# Patient Record
Sex: FEMALE | Race: WHITE
[De-identification: names, ages, dates, MRNs, and addresses within clinical notes are randomized per-mention and may not be internally consistent; named-entity substitution may affect disease eponyms.]

---

## 2018-10-08 ENCOUNTER — HOSPITAL ENCOUNTER (OUTPATIENT)
Dept: HOSPITAL 62 - OD | Age: 58
End: 2018-10-08
Attending: NURSE PRACTITIONER
Payer: MEDICAID

## 2018-10-08 DIAGNOSIS — M79.672: Primary | ICD-10-CM
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
## 2018-10-08 DIAGNOSIS — S92.152D: ICD-10-CM

## 2018-10-08 NOTE — RADIOLOGY REPORT (SQ)
EXAM DESCRIPTION:  FOOT LEFT COMPLETE



COMPLETED DATE/TIME:  10/8/2018 12:35 pm



REASON FOR STUDY:  LEFT FOOT PAIN twisted left foot on stairs 2 weeks ago, continued pain



COMPARISON:  None.



NUMBER OF VIEWS:  Three views.



TECHNIQUE:  AP, lateral and oblique  radiographic images acquired of the left foot.



LIMITATIONS:  None.



FINDINGS:  MINERALIZATION: Normal.

BONES: Patient is post surgery at the 1st MTP joint, with a left 1st metatarsal head prosthesis.  Bon
y remodeling of the base of the great toe proximal phalanx.  No lucency around the hardware worrisome
 for loosening.

Tiny subacute avulsion fracture along the dorsal aspect of the talus best shown on lateral view

JOINTS: No effusions.

SOFT TISSUES: Anterior left ankle soft tissue swelling, in the area of the distal tibialis anterior t
endon.  No foreign body.

OTHER: No other significant finding.



IMPRESSION:  Tiny subacute avulsion fracture along the dorsal aspect of the talus, best shown on late
ral view.  There is adjacent anterior left ankle soft tissue swelling.

Left 1st metatarsal head prosthesis with bony remodeling at the base great toe proximal phalanx.  No 
lucency around the hardware worrisome for loosening



TECHNICAL DOCUMENTATION:  JOB ID:  [id number]

 2011 [employer]- All Rights Reserved



Reading location - IP/workstation name: [email or web address]

## 2018-10-21 ENCOUNTER — HOSPITAL ENCOUNTER (EMERGENCY)
Dept: HOSPITAL 62 - ER | Age: 58
Discharge: HOME | End: 2018-10-21
Payer: MEDICAID

## 2018-10-21 VITALS — SYSTOLIC BLOOD PRESSURE: 117 MMHG | DIASTOLIC BLOOD PRESSURE: 65 MMHG

## 2018-10-21 DIAGNOSIS — E11.9: ICD-10-CM
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
## 2018-10-21 DIAGNOSIS — I10: ICD-10-CM

## 2018-10-21 DIAGNOSIS — S61.512A: Primary | ICD-10-CM

## 2018-10-21 PROCEDURE — 99282 EMERGENCY DEPT VISIT SF MDM: CPT

## 2018-10-21 PROCEDURE — 0HQEXZZ REPAIR LEFT LOWER ARM SKIN, EXTERNAL APPROACH: ICD-10-PCS

## 2018-10-21 NOTE — ER DOCUMENT REPORT
ED Wound





- General


Chief Complaint: Laceration


Stated Complaint: LACERATION TO LEFT WRIST


Time Seen by Provider: 10/21/18 15:09


Mode of Arrival: Ambulatory


Information source: Patient


Notes: 





Patient is a 58-year-old female comes emergency room complaining of a 

laceration to her left wrist.  Patient states she was washing a large glass it 

slipped in her hands she went to grab it and thought she was above the sink to 

pull it toward her body when she was actually at the middle of the sink 

shattering the glass and cutting her left wrist.  The laceration is on the 

little finger side of the left wrist.


TRAVEL OUTSIDE OF THE [place] IN LAST 30 DAYS: No





- HPI


Patient complains to provider of: Laceration


Occurred: Just prior to arrival


Onset/Duration: Sudden, Persistent, Better


Quality of pain: Achy, Sharp


Severity: Moderate


Pain Level: 3


Context: Injury


Skin Color: Normal


Capillary refill: < 3 seconds


Sensations intact: Yes


Distal pulses present: Yes


Associated Symptoms: Bleeding





Past Medical History





- General


Information source: Patient





- Social History


Smoking Status: Never Smoker


Cigarette use (# per day): No


Chew tobacco use (# tins/day): No


Smoking Education Provided: No


Frequency of alcohol use: None


Drug Abuse: None


Family History: Reviewed & Not Pertinent


Patient has suicidal ideation: No


Patient has homicidal ideation: No





- Past Medical History


Cardiac Medical History: Reports: Hx Hypercholesterolemia, Hx Hypertension


Endocrine Medical History: Reports: Hx Diabetes Mellitus Type 2


Renal/ Medical History: Denies: Hx Peritoneal Dialysis


GI Medical History: Reports: Hx Gastroesophageal Reflux Disease


Past Surgical History: Reports: Hx Kidney (Renal Surgery) - bladder stimulation 

implant





- Immunizations


Hx Diphtheria, Pertussis, Tetanus Vaccination: No





Review of Systems





- Review of Systems


Constitutional: No symptoms reported


EENT: No symptoms reported


Cardiovascular: No symptoms reported


Respiratory: No symptoms reported


Gastrointestinal: No symptoms reported


Genitourinary: No symptoms reported


Female Genitourinary: No symptoms reported


Musculoskeletal: No symptoms reported


Skin: Other - Laceration


Hematologic/Lymphatic: No symptoms reported


Neurological/Psychological: No symptoms reported





Physical Exam





- Vital signs


Vitals: 





 











Temp Pulse Resp BP Pulse Ox


 


 98.0 F   100   20   137/58 H  98 


 


 10/21/18 14:34  10/21/18 14:34  10/21/18 14:34  10/21/18 14:34  10/21/18 14:34














- Notes


Notes: 





Patient is a well-nourished well-developed 58-year-old female who does appear 

much older than her stated age.  She is in no apparent distress upon initial 

evaluation





- General


General appearance: Appears well, Alert





- HEENT


Head: Normocephalic, Atraumatic


Eyes: Normal





- Respiratory


Respiratory status: No respiratory distress


Chest status: Nontender


Breath sounds: Normal.  No: Rales, Rhonchi, Stridor, Wheezing


Chest palpation: Normal





- Cardiovascular


Rhythm: Regular


Heart sounds: Normal auscultation


Murmur: No





- Extremities


General upper extremity: Tender, Normal ROM, Normal strength, Normal 

temperature.  No: Normal inspection, Nontender, Edema, Normal color


General lower extremity: Normal inspection, Nontender, Normal ROM, Normal 

strength


Shoulder: Normal


Wrist: Tender, Laceration, Other - Further examination patient's left wrist 

shows that she has on the left side or the lateral side by the little finger 

wrist area a laceration that is in a Y shape.  There is a flap and a left 

superficial tail off of the V.  The entire length is approximately 2 cm when 

you failure in the flap of the be in the line..  No: Deformity, Dislocation, 

Ecchymosis, Instability, Limited ROM, Navicular tenderness





Course





- Re-evaluation


Re-evalutation: 





10/21/18 17:47 patient's stay in the emergency room was limited to the time she 

was here for the suturing.  And she is states she can go by and see her primary 

care provider to have the sutures removed.  I am placing patient on an 

antibiotic because it was cut in the sink there was a dirty food particles in 

other things.  I have informed her to return to ER in 8-10 days for suture 

removal or to see her primary for the same.  Also informed her to come back if 

she has any concerns that is not healing appropriately.





- Vital Signs


Vital signs: 





 











Temp Pulse Resp BP Pulse Ox


 


 98.0 F   100   20   137/58 H  98 


 


 10/21/18 14:34  10/21/18 14:34  10/21/18 14:34  10/21/18 14:34  10/21/18 14:34














Procedures





- Laceration/Wound Repair


  ** Left Wrist


Time completed: 17:48


Wound length (cm): 2


Wound's Depth, Shape: Superficial, Into muscle, Flap, Stellate


Laceration pre-procedure: Sterile PPE donned, Betadine prep applied, Sterile 

drapes applied, Shur-Clens applied


Anesthetic type: 1% Lidocaine


Volume Anesthetic (mLs): 5


Wound explored: No foreign body removed


Irrigated w/ Saline (mLs): 200


Wound Debrided: Minimal


Wound Repaired With: Sutures


Suture Size/Type: 4:0, Prolene


Number of Sutures: 6


Layer Closure?: No


Post-procedure wound care: Sterile dressing applied


Post-procedure NV exam normal: Yes


Complications: No





Discharge





- Discharge


Clinical Impression: 


 Laceration





Condition: Stable


Disposition: HOME, SELF-CARE


Instructions:  Antibiotic Ointment Protection (OM), Laceration Care ([facility])


Additional Instructions: 


Home rest.  Keep the wound clean and dry for 48 hours as much as possible.  

After that you may leave open to air during the day but covered at night.  

Change dressing twice a day and when wet or dirty.  Return to ER in 8-10 days 

for suture removal or sooner if it does not appear to be healing accurately.  

You may also have your primary care provider with the sutures as well.  But I 

would wait a minimum of 8 days and probably suggest 10 to make sure it is 

healed well.  Take all the antibiotics.  And I written you for Diflucan which 

is up a little help stop yeast infections while taking an antibiotic.


Prescriptions: 


Fluconazole [Diflucan] 150 mg PO ONCE PRN #1 tablet


 PRN Reason: 


Sulfamethoxazole/Trimethoprim [Bactrim 400-80 mg Tablet] 1 each PO BID 5 Days #

10 tablet


Referrals: 


MARLINE HOOD FNP-C [Primary Care Provider] - Follow up as needed

## 2019-01-18 ENCOUNTER — HOSPITAL ENCOUNTER (OUTPATIENT)
Dept: HOSPITAL 62 - OD | Age: 59
End: 2019-01-18
Attending: NURSE PRACTITIONER
Payer: MEDICAID

## 2019-01-18 DIAGNOSIS — R10.11: Primary | ICD-10-CM

## 2019-01-18 PROCEDURE — 74018 RADEX ABDOMEN 1 VIEW: CPT

## 2019-01-18 NOTE — RADIOLOGY REPORT (SQ)
EXAM DESCRIPTION:  KUB



COMPLETED DATE/TIME:  1/18/2019 10:28 am



REASON FOR STUDY:  RT UPPER QUADRANT ABDOMINAL PAIN R10.11  RIGHT UPPER QUADRANT PAIN



COMPARISON:  None.



NUMBER OF VIEWS:  One view.



TECHNIQUE:   Supine radiographic image of the abdomen acquired.



LIMITATIONS:  None.



FINDINGS:  BOWEL GAS PATTERN: Normal bowel gas pattern. No dilated loops.

CALCIFICATIONS: 8 mm calcification in the right upper quadrant above the kidney.

SOFT TISSUES: No gross mass or suggestion of organomegaly.

HARDWARE: Neurostimulator.

BONES: No acute fracture. No worrisome bone lesions.

OTHER: No other significant finding.



IMPRESSION:  Cannot exclude gallstone.  Study is otherwise unremarkable



TECHNICAL DOCUMENTATION:  JOB ID:  [id number]

 2011 [employer]- All Rights Reserved



Reading location - IP/workstation name: [email or web address]

## 2019-03-23 ENCOUNTER — HOSPITAL ENCOUNTER (EMERGENCY)
Dept: HOSPITAL 62 - ER | Age: 59
Discharge: HOME | End: 2019-03-23
Payer: MEDICAID

## 2019-03-23 VITALS — DIASTOLIC BLOOD PRESSURE: 66 MMHG | SYSTOLIC BLOOD PRESSURE: 134 MMHG

## 2019-03-23 DIAGNOSIS — Z88.6: ICD-10-CM

## 2019-03-23 DIAGNOSIS — R42: Primary | ICD-10-CM

## 2019-03-23 DIAGNOSIS — I10: ICD-10-CM

## 2019-03-23 DIAGNOSIS — Z91.040: ICD-10-CM

## 2019-03-23 DIAGNOSIS — E11.9: ICD-10-CM

## 2019-03-23 DIAGNOSIS — R11.2: ICD-10-CM

## 2019-03-23 DIAGNOSIS — Z88.0: ICD-10-CM

## 2019-03-23 LAB
ADD MANUAL DIFF: NO
ALBUMIN SERPL-MCNC: 4.4 G/DL (ref 3.5–5)
ALP SERPL-CCNC: 120 U/L (ref 38–126)
ALT SERPL-CCNC: 138 U/L (ref 9–52)
ANION GAP SERPL CALC-SCNC: 14 MMOL/L (ref 5–19)
APPEARANCE UR: (no result)
APTT PPP: YELLOW S
AST SERPL-CCNC: 76 U/L (ref 14–36)
BASOPHILS # BLD AUTO: 0.1 10^3/UL (ref 0–0.2)
BASOPHILS NFR BLD AUTO: 1.3 % (ref 0–2)
BILIRUB DIRECT SERPL-MCNC: 0.3 MG/DL (ref 0–0.4)
BILIRUB SERPL-MCNC: 0.5 MG/DL (ref 0.2–1.3)
BILIRUB UR QL STRIP: NEGATIVE
BUN SERPL-MCNC: 15 MG/DL (ref 7–20)
CALCIUM: 10.4 MG/DL (ref 8.4–10.2)
CHLORIDE SERPL-SCNC: 97 MMOL/L (ref 98–107)
CO2 SERPL-SCNC: 26 MMOL/L (ref 22–30)
EOSINOPHIL # BLD AUTO: 0.2 10^3/UL (ref 0–0.6)
EOSINOPHIL NFR BLD AUTO: 2.1 % (ref 0–6)
ERYTHROCYTE [DISTWIDTH] IN BLOOD BY AUTOMATED COUNT: 14.3 % (ref 11.5–14)
GLUCOSE SERPL-MCNC: 169 MG/DL (ref 75–110)
GLUCOSE UR STRIP-MCNC: 150 MG/DL
HCT VFR BLD CALC: 41.8 % (ref 36–47)
HGB BLD-MCNC: 14.4 G/DL (ref 12–15.5)
KETONES UR STRIP-MCNC: NEGATIVE MG/DL
LYMPHOCYTES # BLD AUTO: 2.5 10^3/UL (ref 0.5–4.7)
LYMPHOCYTES NFR BLD AUTO: 31.6 % (ref 13–45)
MCH RBC QN AUTO: 28.4 PG (ref 27–33.4)
MCHC RBC AUTO-ENTMCNC: 34.5 G/DL (ref 32–36)
MCV RBC AUTO: 82 FL (ref 80–97)
MONOCYTES # BLD AUTO: 0.5 10^3/UL (ref 0.1–1.4)
MONOCYTES NFR BLD AUTO: 6.8 % (ref 3–13)
NEUTROPHILS # BLD AUTO: 4.5 10^3/UL (ref 1.7–8.2)
NEUTS SEG NFR BLD AUTO: 58.2 % (ref 42–78)
NITRITE UR QL STRIP: NEGATIVE
PH UR STRIP: 5 [PH] (ref 5–9)
PLATELET # BLD: 258 10^3/UL (ref 150–450)
POTASSIUM SERPL-SCNC: 3.9 MMOL/L (ref 3.6–5)
PROT SERPL-MCNC: 7.9 G/DL (ref 6.3–8.2)
PROT UR STRIP-MCNC: NEGATIVE MG/DL
RBC # BLD AUTO: 5.08 10^6/UL (ref 3.72–5.28)
SODIUM SERPL-SCNC: 136.9 MMOL/L (ref 137–145)
SP GR UR STRIP: 1.02
TOTAL CELLS COUNTED % (AUTO): 100 %
UROBILINOGEN UR-MCNC: NEGATIVE MG/DL (ref ?–2)
WBC # BLD AUTO: 7.8 10^3/UL (ref 4–10.5)

## 2019-03-23 PROCEDURE — S0119 ONDANSETRON 4 MG: HCPCS

## 2019-03-23 PROCEDURE — 81001 URINALYSIS AUTO W/SCOPE: CPT

## 2019-03-23 PROCEDURE — 36415 COLL VENOUS BLD VENIPUNCTURE: CPT

## 2019-03-23 PROCEDURE — 99283 EMERGENCY DEPT VISIT LOW MDM: CPT

## 2019-03-23 PROCEDURE — 85025 COMPLETE CBC W/AUTO DIFF WBC: CPT

## 2019-03-23 PROCEDURE — 80053 COMPREHEN METABOLIC PANEL: CPT

## 2019-03-23 NOTE — ER DOCUMENT REPORT
ED Medical Screen (RME)





- General


Chief Complaint: Vertigo


Stated Complaint: DIZZY


Time Seen by Provider: 03/23/19 16:37


Primary Care Provider: 


MARLINE HOOD FNP-C [Primary Care Provider] - Follow up as needed


Mode of Arrival: Medic


Information source: Patient


TRAVEL OUTSIDE OF THE [place] IN LAST 30 DAYS: No





- HPI


Patient complains to provider of: vertigo; vomiting


Onset: Yesterday - pt with h/o vertigo with onset yesterday with vomiting





- Related Data


Allergies/Adverse Reactions: 


                                        





acetaminophen [From Vicodin] Allergy (Verified 03/23/19 15:52)


   


fexofenadine [From Allegra] Allergy (Verified 03/23/19 15:52)


   


hydrocodone [From Vicoprofen] Allergy (Verified 03/23/19 15:52)


   


ibuprofen [From Vicoprofen] Allergy (Verified 03/23/19 15:52)


   


iodine Allergy (Verified 03/23/19 15:52)


   


Latex, Natural Rubber Allergy (Verified 03/23/19 15:52)


   


Penicillins Allergy (Verified 03/23/19 15:52)


   











Past Medical History





- Social History


Chew tobacco use (# tins/day): No


Frequency of alcohol use: None


Drug Abuse: None





- Past Medical History


Cardiac Medical History: Reports: Hx Hypercholesterolemia, Hx Hypertension


Endocrine Medical History: Reports: Hx Diabetes Mellitus Type 2


Renal/ Medical History: Denies: Hx Peritoneal Dialysis


GI Medical History: Reports: Hx Gastroesophageal Reflux Disease


Past Surgical History: Reports: Hx Kidney (Renal Surgery) - bladder stimulation 

implant





- Immunizations


Hx Diphtheria, Pertussis, Tetanus Vaccination: No





Physical Exam





- Vital signs


Vitals: 





                                        











Temp Pulse Resp BP Pulse Ox


 


 97.5 F   115 H  16   173/73 H  97 


 


 03/23/19 15:56  03/23/19 15:56  03/23/19 15:56  03/23/19 15:56  03/23/19 15:56














Course





- Vital Signs


Vital signs: 





                                        











Temp Pulse Resp BP Pulse Ox


 


 97.5 F   115 H  16   173/73 H  97 


 


 03/23/19 15:56  03/23/19 15:56  03/23/19 15:56  03/23/19 15:56  03/23/19 15:56














Doctor's Discharge





- Discharge


Referrals: 


MARLINE HOOD FNP-C [Primary Care Provider] - Follow up as needed

## 2019-03-23 NOTE — ER DOCUMENT REPORT
ED General





- General


Chief Complaint: Vertigo


Stated Complaint: DIZZY


Time Seen by Provider: 03/23/19 16:37


Primary Care Provider: 


MARLINE HOOD FNP-C [Primary Care Provider] - Follow up as needed


Mode of Arrival: Medic


Notes: 





Patient is a 58-year-old female with past medical history of diabetes, 

hypertension, intermittent vertigo, presents complaining of 3 days of vertigo.  

Patient reports that these are episodes of room spinning with associated nausea 

and vomiting.  States that movement of her head or eyes seems to trigger the 

symptoms.  States this feels identical to episodes of vertigo that she has had 

in the past that typically respond to Zofran and meclizine.  She does not have 

these medicines available at home and came to the emergency department for 

treatment.  She has not seen her primary doctor regarding today's concerns.  She

denies any focal weakness, numbness, confusion, neck pain, or difficulty 

walking.  No falls.  No head trauma.  States that she feels somewhat better 

since receiving meclizine in triage.


TRAVEL OUTSIDE OF THE [place] IN LAST 30 DAYS: No





- Related Data


Allergies/Adverse Reactions: 


                                        





acetaminophen [From Vicodin] Allergy (Verified 03/23/19 15:52)


   


fexofenadine [From Allegra] Allergy (Verified 03/23/19 15:52)


   


hydrocodone [From Vicoprofen] Allergy (Verified 03/23/19 15:52)


   


ibuprofen [From Vicoprofen] Allergy (Verified 03/23/19 15:52)


   


iodine Allergy (Verified 03/23/19 15:52)


   


Latex, Natural Rubber Allergy (Verified 03/23/19 15:52)


   


Penicillins Allergy (Verified 03/23/19 15:52)


   











Past Medical History





- General


Information source: Patient





- Social History


Smoking Status: Never Smoker


Chew tobacco use (# tins/day): No


Frequency of alcohol use: None


Drug Abuse: None


Lives with: Friend


Family History: Reviewed & Not Pertinent


Patient has suicidal ideation: No


Patient has homicidal ideation: No





- Past Medical History


Cardiac Medical History: Reports: Hx Hypercholesterolemia, Hx Hypertension


Endocrine Medical History: Reports: Hx Diabetes Mellitus Type 2


Renal/ Medical History: Denies: Hx Peritoneal Dialysis


GI Medical History: Reports: Hx Gastroesophageal Reflux Disease


Past Surgical History: Reports: Hx Kidney (Renal Surgery) - bladder stimulation 

implant





- Immunizations


Hx Diphtheria, Pertussis, Tetanus Vaccination: No





Review of Systems





- Review of Systems


Notes: 





Constitutional: Negative for fever.


HENT: Negative for sore throat.


Eyes: Negative for visual changes.


Cardiovascular: Negative for chest pain.


Respiratory: Negative for shortness of breath.


Gastrointestinal: Negative for abdominal pain, positive for nausea and vomiting


Genitourinary: Negative for dysuria.


Musculoskeletal: Negative for back pain.


Skin: Negative for rash.


Neurological: Positive for dizziness





10 point ROS negative except as marked above and in HPI.





Physical Exam





- Vital signs


Vitals: 





                                        











Temp Pulse Resp BP Pulse Ox


 


 97.5 F   115 H  16   173/73 H  97 


 


 03/23/19 15:56  03/23/19 15:56  03/23/19 15:56  03/23/19 15:56  03/23/19 15:56











Interpretation: Hypertensive - Tachycardia resolved at the time of my 

assessment, Tachycardic


Notes: 





PHYSICAL EXAMINATION:





GENERAL: Well-appearing, well-nourished and in no acute distress.





HEAD: Atraumatic, normocephalic.





EYES: Pupils equal round and reactive to light, extraocular movements intact, 

sclera anicteric, conjunctiva are normal.





ENT: nares patent, oropharynx clear without exudates.  Moderately dry mucous 

membranes.





NECK: Normal range of motion, supple without lymphadenopathy





LUNGS: Breath sounds clear to auscultation bilaterally and equal.  No wheezes 

rales or rhonchi.





HEART: Regular rate and rhythm without murmurs





ABDOMEN: Soft, nontender, normoactive bowel sounds.  No guarding, no rebound.  

No masses appreciated.





EXTREMITIES: Normal range of motion, no pitting or edema.  No cyanosis.





NEUROLOGICAL: Face symmetric.  Tongue protrudes midline.  Extraocular motions 

intact.  Pupils are 2 mm and equally reactive.  Normal speech, normal gait.  5 

out of 5 strength in both the distal and proximal upper and lower extremities 

bilaterally.  Sensation is grossly intact throughout.  Finger to nose testing 

normal.  Pronator drift normal.





PSYCH: Anxious





SKIN: Warm, Dry, normal turgor, no rashes or lesions noted.





Course





- Re-evaluation


Re-evalutation: 





03/23/19 19:30


Presentation of vertigo that appears most consistent with a benign peripheral 

vertigo.  Patient has no abnormal findings on exam. Normal cerebellar testing, 

steady even gait. Able to walk on heels and toes. Normal proprioception.  

Patient is not an elevated risk for a cerebellar infarction given age, absence 

of significant risk factors.  Patient did have improvement of symptoms here in 

the emergency department with meclizine.  Has had this recurrently in the past, 

very low clinical suspicion for cerebral infarction.  Suspect likely acute 

vestibular neuritis.  I do not believe neurologic imaging is indicated at this 

time based on physical examination and clinical history.  At this time will 

discharge with return precautions and follow-up recommendations.  Verbal 

discharge instructions given a the bedside and opportunity for questions given. 

Medication warnings reviewed. Patient is in agreement with this plan and has 

verbalized understanding of return precautions and the need for primary care 

follow-up in the next 24-72 hours.





- Vital Signs


Vital signs: 





                                        











Temp Pulse Resp BP Pulse Ox


 


 97.5 F   115 H  16   173/73 H  97 


 


 03/23/19 15:56  03/23/19 15:56  03/23/19 15:56  03/23/19 15:56  03/23/19 15:56














- Laboratory


Result Diagrams: 


                                 03/23/19 17:42





                                 03/23/19 17:42


Laboratory results interpreted by me: 





                                        











  03/23/19 03/23/19 03/23/19





  17:42 17:42 17:42


 


RDW  14.3 H  


 


Sodium   136.9 L 


 


Chloride   97 L 


 


Creatinine   0.39 L 


 


Glucose   169 H 


 


Calcium   10.4 H 


 


AST   76 H 


 


ALT   138 H 


 


Urine Glucose (UA)    150 H


 


Ur Leukocyte Esterase    TRACE H














Discharge





- Discharge


Clinical Impression: 


 Vertigo





Nausea and vomiting


Qualifiers:


 Vomiting type: unspecified Vomiting Intractability: non-intractable Qualified 

Code(s): R11.2 - Nausea with vomiting, unspecified





Condition: Good


Disposition: HOME, SELF-CARE


Additional Instructions: 


You were seen today for lightheadedness/dizziness.  The exact cause of your 

symptoms is unclear but your workup here is reassuring without any concerning 

findings.  Please follow closely with your primary care physician in the next 1-

3 days.  Return if you pass out, have additional episodes of lightheadedness, 

develop weakness/numbness, have persistent vomiting, chest pain, shortness of 

breath or any other symptoms that are concerning to you


Prescriptions: 


Meclizine HCl [Antivert 25 mg Tablet] 25 mg PO TID PRN #21 tablet


 PRN Reason: 


Ondansetron [Zofran Odt 4 mg Tablet] 1 - 2 tab PO Q4H PRN #15 tab.rapdis


 PRN Reason: For Nausea/Vomiting


Referrals: 


MARLINE HOOD FNP-C [Primary Care Provider] - Follow up in 3-5 days

## 2019-04-17 ENCOUNTER — HOSPITAL ENCOUNTER (OUTPATIENT)
Dept: HOSPITAL 62 - RAD | Age: 59
End: 2019-04-17
Payer: MEDICAID

## 2019-04-17 DIAGNOSIS — R07.9: Primary | ICD-10-CM

## 2019-04-17 DIAGNOSIS — R01.1: ICD-10-CM

## 2019-04-17 PROCEDURE — A9500 TC99M SESTAMIBI: HCPCS

## 2019-04-17 PROCEDURE — 93306 TTE W/DOPPLER COMPLETE: CPT

## 2019-04-17 PROCEDURE — 78452 HT MUSCLE IMAGE SPECT MULT: CPT

## 2019-04-17 PROCEDURE — 93017 CV STRESS TEST TRACING ONLY: CPT

## 2019-04-18 NOTE — XCELERA REPORT
[address] [address]

                               Tel: [phone]

                               Fax: [phone]



                      Transthoracic Echocardiogram Report

_______________________________________________________________________________



Name: LADAN MILES

MRN: [id number]                                Age: 58 yrs

Gender: Female                                 : 1960

Patient Status: Preadmit                       Patient Location: RAD

Account #: [id number]

Study Date: 2019 11:28 AM

Accession #: [id number]

_______________________________________________________________________________



Height: 66 in        Weight: 189 lb        BSA: 2.0 m2

_______________________________________________________________________________

Procedure: A two-dimensional transthoracic echocardiogram with color flow and

Doppler was performed. The study was technically difficult with many images

being suboptimal in quality. Study Quality: Technically suboptimal.

Reason For Study: MURMUR



History: MURMUR.

Ordering Physician: AURA RICHARDSON



Performed By: Lula Valencia

_______________________________________________________________________________



Interpretation Summary

The left ventricle is normal in size.

There is normal left ventricular wall thickness.

The left ventricular ejection fraction is within normal limits.

LV EF is Greaterthan 65%

Doppler measurements suggest impaired left ventricular relaxation, which is

associated with grade I/IV or mild diastolic dysfunction

The left ventricular wall motion is normal.

There is no thrombus.

Cannot assess ASD,VSD , or PFO.

The right ventricle is not well visualized secondary to technical limitations

The right ventricle is grossly normal size.

The right atrium is normal.

The left atrial size is normal.

There is no evidence of mitral valve prolapse.

There is no vegetation seen on the mitral valve.

There is no mitral valve stenosis.

There is a trace amount of mitral regurgitation

There is no aortic valvular vegetation.

There is no aortic valve stenosis

There is no LVOT obstruction.

No aortic regurgitation is present.

There is no tricuspid stenosis.

There is a mild amount of tricuspid regurgitation

There is moderate pulmonary hypertension by echo

RVSP is 48 t0 53 mm of Hg , with RA mean of 5 to 10.

There is no pulmonic valvular stenosis.

There is no pulmonic valvular regurgitation.

The aortic root is normal size.

The inferior vena cava appeared normal and decreased > 50% with respiration

(RAP 5-10 mmHg)

There is no pericardial effusion.



MMode/2D Measurements & Calculations

RVDd: 3.0 cm   LVIDd: 4.5 cm   FS: 38.3 %             Ao root diam: 2.9 cm



IVSd: 0.87 cm  LVIDs: 2.8 cm   EDV(Teich): 92.4 ml    Ao root area: 6.4 cm2

               LVPWd: 0.92 cm  ESV(Teich): 28.9 ml    LA dimension: 3.2 cm

                               EF(Teich): 68.7 %



Doppler Measurements & Calculations

MV E max jc:      MV P1/2t max jc:     Ao V2 max:         LV V1 max P.9 cm/sec        95.4 cm/sec           143.3 cm/sec       5.8 mmHg

MV A max jc:      MV P1/2t: 54.8 msec   Ao max P.2 mmHgLV V1 max:

129.4 cm/sec       MVA(P1/2t): 4.0 cm2                      120.1 cm/sec

MV E/A: 0.73       MV dec slope:



                   510.2 cm/sec2

                   MV dec time: 0.18 sec

        _______________________________________________________________

PA V2 max:         TR max jc:           MV P1/2t-pr_phl:

140.4 cm/sec       325.8 cm/sec          54.8 msec

PA max P.9 mmHgTR max P.5 mmHg





Left Ventricle

The left ventricle is normal in size. There is normal left ventricular wall

thickness. The left ventricular ejection fraction is within normal limits. LV

EF is Greaterthan 65%. Doppler measurements suggest impaired left ventricular

relaxation, which is associated with grade I/IV or mild diastolic dysfunction.

The left ventricular wall motion is normal. There is no thrombus. Cannot

assess ASD,VSD , or PFO.



Right Ventricle

The right ventricle is not well visualized secondary to technical limitations.

The right ventricle is grossly normal size.



Atria

The right atrium is normal. The left atrial size is normal.



Mitral Valve

There is no evidence of mitral valve prolapse. There is no vegetation seen on

the mitral valve. There is no mitral valve stenosis. There is a trace amount

of mitral regurgitation.





Aortic Valve

There is no aortic valvular vegetation. There is no aortic valve stenosis.

There is no LVOT obstruction. No aortic regurgitation is present.



Tricuspid Valve

There is no tricuspid stenosis. There is a mild amount of tricuspid

regurgitation. There is moderate pulmonary hypertension by echo. RVSP is 48 t0

53 mm of Hg , with RA mean of 5 to 10.



Pulmonic Valve

There is no pulmonic valvular stenosis. There is no pulmonic valvular

regurgitation.



Great Vessels

The aortic root is normal size. The inferior vena cava appeared normal and

decreased > 50% with respiration (RAP 5-10 mmHg).



Effusions

There is no pericardial effusion.





_______________________________________________________________________________

_______________________________________________________________________________



Electronically signed by:      Nayely Mckeon      on 2019 11:35 PM



CC: AURA RICHARDSON

>

Nayely Mckeon

## 2019-04-18 NOTE — DRAGON STRESS TEST REPORT
Intravenous Lexiscan Cardiolite stress test using single photon emmision 
computerized tomography.



Date of procedure: 4/17/2019. Ordering Provider: Dr. Bhavin Adame.  
Patient's status: Out Patient.



Indication: Chest pain.  Coronary risk factors: Age, diabetes mellitus, 
hypertension, dyslipidemia, and premature coronary artery disease history in the
family.



Resting EKG: Sinus Rhythm..  Within Normal Limits.



Stress EKG: No changes of ischemia.



The patient had no chest pain or discomfort, and there were no arrhythmias seen.



Reason for termination: Protocol.



Conclusions: Normal EKG and hemodynamic response to IV Lexiscan.



Nuclear data:

At rest the patient was given 12.31 millicuries of technetium 99m sestamibi 
injected intravenously.  As per protocol rest non gated SPECT images were 
obtained.  Subsequently the patient was given intravenous Lexiscan at a dose of 
0.4 mg in 5 mL intravenously, followed by flush with normal saline.  
Subsequently the stress dose of 39.0 millicuries of technetium 99m sestamibi was
injected intravenously.  As per protocol stress gated images were obtained. 



Nuclear interpretation: 



Review of images showed that all segments of the myocardium had normal perfusion
at rest, and normal perfusion post stress with IV Lexiscan.

All segments of the myocardium had normal motion, contraction, and thickening by
gated study.

T. I D. ratio was normal at 1.02.  Computer read rest, and stress left 
ventricular ejection fraction were 71 %, and 66 %, respectively.   



Conclusion:



1.  There is no scintigraphic evidence of Lexiscan induced myocardial ischemia.

2.  There is no scintigraphic evidence of myocardial infarction/scar.





Recommendations:

Aggressive risk factor modification, and treating the underlying co- 
morbidities.  

MTDD